# Patient Record
(demographics unavailable — no encounter records)

---

## 2025-03-11 NOTE — HISTORY OF PRESENT ILLNESS
[FreeTextEntry1] : 70 y.o. male with h/o Type 2 DM diagnosed in July 2015 presents for follow up visit.  No acute events since the last  In regard to Type 2 DM related complications, he does have CKD. Sees Dr. Rucker (nephrology) and taking Ramipril now. Also has h/o hyperkalemia and taking Lokelma. UTD with ophthalmologist and went in 2/2024 and no retinopathy. Does have cataract. No neuropathy. Self manages foot care. Last podiatry visit in 8/2019.   Takes Ozempic 2 mg SQ weekly, Metformin ER 1,000 mg daily and Farxiga 10 mg daily which he is tolerating well. Reports increase in urination at night since starting Farxiga but manageable.   Checks FS 1 time per day and morning are 130s. No hypoglycemia.   Diet: Has met with RD Breakfast: toast or granola with fruit (pineapple, cantaloupe, pears) Lunch: fruit Dinner: protein, vegetables and starch Snacks: fruits Drinks: water  Walking daily about 2 miles when weather permits.   No complaints today. No polyuria and no polydipsia. No SOB or CP. No GI symptoms.  In regard to hyperlipidemia, he follows with cardiology and also taking Atorvastatin 20 mg daily.   In regard to vitamin D def, takes vitamin D3 5,000 Iu daily.

## 2025-03-11 NOTE — PHYSICAL EXAM
[Alert] : alert [No Acute Distress] : no acute distress [Normal Sclera/Conjunctiva] : normal sclera/conjunctiva [EOMI] : extra ocular movement intact [No LAD] : no lymphadenopathy [Thyroid Not Enlarged] : the thyroid was not enlarged [No Thyroid Nodules] : no palpable thyroid nodules [No Respiratory Distress] : no respiratory distress [Clear to Auscultation] : lungs were clear to auscultation bilaterally [Normal S1, S2] : normal S1 and S2 [Regular Rhythm] : with a regular rhythm [No Edema] : no peripheral edema [Normal Bowel Sounds] : normal bowel sounds [Not Tender] : non-tender [Not Distended] : not distended [Soft] : abdomen soft [Normal Anterior Cervical Nodes] : no anterior cervical lymphadenopathy [No Clubbing, Cyanosis] : no clubbing  or cyanosis of the fingernails [No Rash] : no rash [Normal Reflexes] : deep tendon reflexes were 2+ and symmetric [Normal Affect] : the affect was normal [Normal Mood] : the mood was normal [de-identified] : last foot exam in September 2024

## 2025-03-11 NOTE — ASSESSMENT
[Diabetes Foot Care] : diabetes foot care [Long Term Vascular Complications] : long term vascular complications of diabetes [Carbohydrate Consistent Diet] : carbohydrate consistent diet [Importance of Diet and Exercise] : importance of diet and exercise to improve glycemic control, achieve weight loss and improve cardiovascular health [FreeTextEntry1] : 70 y.o. male with h/o Type 2 DM complicated by CKD, HTN, hyperlipidemia and vitamin D def.  1. Type 2 DM- Suboptimal control with Hba1c of 7.5% today. Discussed pathophysiology and reviewed blood glucose and HBa1c goals. Encouraged a carbohydrate consistent diet and exercise. Will continue Ozempic 2 mg SQ weekly for now and stressed compliance. Will switch from Ozempic to Mounjaro 10 mg SQ weekly when he returns from [place]. Reviewed risks and benefits of GLP-1 Manny including pancreatitis and MTC. Will continue Metformin ER 1,000 mg daily given decrease in GFR. Will continue Farxiga 10 mg daily. Reviewed risks and benefits of SGLT-2 inhibitors. Given CKD and hyperkalemia, agree with nephrology follow up and on Lokelma. Stable CMP and urine alb/cr ratio in March 2024 and will recheck.   2. HTN- BP is at goal and will continue ACE-I. Will remain off HCTZ since starting SGLT-2 inhibitor.  3. Hyperlipidemia- Will check lipids and will continue Atorvastatin 20 mg daily.  4. Vitamin D def- Normal 25 vitamin D level and will continue supplement.    Follow up in 3 to 4 months.

## 2025-03-11 NOTE — REVIEW OF SYSTEMS
[Recent Weight Loss (___ Lbs)] : recent weight loss: [unfilled] lbs [Nocturia] : nocturia [All other systems negative] : All other systems negative [Recent Weight Gain (___ Lbs)] : no recent weight gain

## 2025-03-11 NOTE — END OF VISIT
[Time Spent: ___ minutes] : I have spent [unfilled] minutes of time on the encounter which excludes teaching and separately reported services. Oriented - self; Oriented - place; Oriented - time

## 2025-03-11 NOTE — PHYSICAL EXAM
[Alert] : alert [No Acute Distress] : no acute distress [Normal Sclera/Conjunctiva] : normal sclera/conjunctiva [EOMI] : extra ocular movement intact [No LAD] : no lymphadenopathy [Thyroid Not Enlarged] : the thyroid was not enlarged [No Thyroid Nodules] : no palpable thyroid nodules [No Respiratory Distress] : no respiratory distress [Clear to Auscultation] : lungs were clear to auscultation bilaterally [Normal S1, S2] : normal S1 and S2 [Regular Rhythm] : with a regular rhythm [No Edema] : no peripheral edema [Normal Bowel Sounds] : normal bowel sounds [Not Tender] : non-tender [Not Distended] : not distended [Soft] : abdomen soft [Normal Anterior Cervical Nodes] : no anterior cervical lymphadenopathy [No Clubbing, Cyanosis] : no clubbing  or cyanosis of the fingernails [No Rash] : no rash [Normal Reflexes] : deep tendon reflexes were 2+ and symmetric [Normal Affect] : the affect was normal [Normal Mood] : the mood was normal [de-identified] : last foot exam in September 2024

## 2025-06-10 NOTE — ASSESSMENT
[Diabetes Foot Care] : diabetes foot care [Long Term Vascular Complications] : long term vascular complications of diabetes [Carbohydrate Consistent Diet] : carbohydrate consistent diet [Importance of Diet and Exercise] : importance of diet and exercise to improve glycemic control, achieve weight loss and improve cardiovascular health [FreeTextEntry1] : 71 y.o. male with h/o Type 2 DM complicated by CKD, HTN, hyperlipidemia and vitamin D def.  1. Type 2 DM- Fair control with Hba1c of 7.0% today. Discussed pathophysiology and reviewed blood glucose and HBa1c goals. Encouraged a carbohydrate consistent diet and exercise.  Will continue Mounjaro 10 mg SQ weekly and will increase to 12.5 mg dose when due for refill. Reviewed risks and benefits of GLP-1 Manny including pancreatitis and MTC. Will continue Metformin ER 1,000 mg daily given decrease in GFR. Will continue Farxiga 10 mg daily. Reviewed risks and benefits of SGLT-2 inhibitors. Given CKD and hyperkalemia, agree with nephrology follow up and on Lokelma. Stable CMP and urine alb/cr ratio in March 2025.    2. HTN- BP is at goal and will continue ACE-I. Will remain off HCTZ since starting SGLT-2 inhibitor.  3. Hyperlipidemia- UTD with lipids and will continue Atorvastatin 20 mg daily.  4. Vitamin D def- Normal 25 vitamin D level and will continue supplement.    Follow up in 3 to 4 months.

## 2025-06-10 NOTE — HISTORY OF PRESENT ILLNESS
[FreeTextEntry1] : 71 y.o. male with h/o Type 2 DM diagnosed in July 2015 presents for follow up visit.  No acute events since the last  In regard to Type 2 DM related complications, he does have CKD. Sees Dr. Rucker (nephrology) and taking Ramipril now. Also has h/o hyperkalemia and taking Lokelma. UTD with ophthalmologist and went in 4/2025 and no retinopathy. Does have cataract. No neuropathy. Self manages foot care. Last podiatry visit in 8/2019.   Takes Mounjaro 10 mg SQ weekly for 1 month (replaced Ozempic 2 mg SQ weekly), Metformin ER 1,000 mg daily and Farxiga 10 mg daily which he is tolerating well. Reports increase in urination at night since starting Farxiga but manageable.   Checks FS 1 time per day and morning are 120s to 130s. No hypoglycemia.   Diet: Has met with RD Breakfast: toast or granola with fruit (pineapple, cantaloupe, pears) Lunch: fruit Dinner: protein, vegetables and starch Snacks: fruits Drinks: water  Walking daily about 2 miles when weather permits.   No complaints today. No polyuria and no polydipsia. No SOB or CP. No GI symptoms.  In regard to hyperlipidemia, he follows with cardiology and also taking Atorvastatin 20 mg daily.   In regard to vitamin D def, takes vitamin D3 5,000 Iu daily.

## 2025-06-10 NOTE — PHYSICAL EXAM
[Alert] : alert [No Acute Distress] : no acute distress [Normal Sclera/Conjunctiva] : normal sclera/conjunctiva [EOMI] : extra ocular movement intact [No LAD] : no lymphadenopathy [Thyroid Not Enlarged] : the thyroid was not enlarged [No Thyroid Nodules] : no palpable thyroid nodules [No Respiratory Distress] : no respiratory distress [Clear to Auscultation] : lungs were clear to auscultation bilaterally [Normal S1, S2] : normal S1 and S2 [Regular Rhythm] : with a regular rhythm [No Edema] : no peripheral edema [Normal Bowel Sounds] : normal bowel sounds [Not Tender] : non-tender [Not Distended] : not distended [Soft] : abdomen soft [Normal Anterior Cervical Nodes] : no anterior cervical lymphadenopathy [No Clubbing, Cyanosis] : no clubbing  or cyanosis of the fingernails [No Rash] : no rash [Normal Reflexes] : deep tendon reflexes were 2+ and symmetric [Normal Affect] : the affect was normal [Normal Mood] : the mood was normal [de-identified] : last foot exam in September 2024

## 2025-06-10 NOTE — PHYSICAL EXAM
[Alert] : alert [No Acute Distress] : no acute distress [Normal Sclera/Conjunctiva] : normal sclera/conjunctiva [EOMI] : extra ocular movement intact [No LAD] : no lymphadenopathy [Thyroid Not Enlarged] : the thyroid was not enlarged [No Thyroid Nodules] : no palpable thyroid nodules [No Respiratory Distress] : no respiratory distress [Clear to Auscultation] : lungs were clear to auscultation bilaterally [Normal S1, S2] : normal S1 and S2 [Regular Rhythm] : with a regular rhythm [No Edema] : no peripheral edema [Normal Bowel Sounds] : normal bowel sounds [Not Tender] : non-tender [Not Distended] : not distended [Soft] : abdomen soft [Normal Anterior Cervical Nodes] : no anterior cervical lymphadenopathy [No Clubbing, Cyanosis] : no clubbing  or cyanosis of the fingernails [No Rash] : no rash [Normal Reflexes] : deep tendon reflexes were 2+ and symmetric [Normal Affect] : the affect was normal [Normal Mood] : the mood was normal [de-identified] : last foot exam in September 2024